# Patient Record
Sex: FEMALE | Race: WHITE | NOT HISPANIC OR LATINO | Employment: PART TIME | URBAN - METROPOLITAN AREA
[De-identification: names, ages, dates, MRNs, and addresses within clinical notes are randomized per-mention and may not be internally consistent; named-entity substitution may affect disease eponyms.]

---

## 2017-01-04 ENCOUNTER — ALLSCRIPTS OFFICE VISIT (OUTPATIENT)
Dept: OTHER | Facility: OTHER | Age: 62
End: 2017-01-04

## 2017-03-07 ENCOUNTER — ALLSCRIPTS OFFICE VISIT (OUTPATIENT)
Dept: OTHER | Facility: OTHER | Age: 62
End: 2017-03-07

## 2017-03-17 ENCOUNTER — ALLSCRIPTS OFFICE VISIT (OUTPATIENT)
Dept: OTHER | Facility: OTHER | Age: 62
End: 2017-03-17

## 2017-05-09 ENCOUNTER — LAB REQUISITION (OUTPATIENT)
Dept: LAB | Facility: HOSPITAL | Age: 62
End: 2017-05-09
Payer: COMMERCIAL

## 2017-05-09 ENCOUNTER — ALLSCRIPTS OFFICE VISIT (OUTPATIENT)
Dept: OTHER | Facility: OTHER | Age: 62
End: 2017-05-09

## 2017-05-09 DIAGNOSIS — Z12.72 ENCOUNTER FOR SCREENING FOR MALIGNANT NEOPLASM OF VAGINA: ICD-10-CM

## 2017-05-09 PROCEDURE — G0145 SCR C/V CYTO,THINLAYER,RESCR: HCPCS | Performed by: OBSTETRICS & GYNECOLOGY

## 2017-05-12 DIAGNOSIS — I82.409 ACUTE EMBOLISM AND THROMBOSIS OF DEEP VEIN OF LOWER EXTREMITY (HCC): ICD-10-CM

## 2017-05-15 ENCOUNTER — HOSPITAL ENCOUNTER (OUTPATIENT)
Dept: ULTRASOUND IMAGING | Facility: MEDICAL CENTER | Age: 62
Discharge: HOME | End: 2017-05-15
Payer: COMMERCIAL

## 2017-05-15 DIAGNOSIS — I82.409 ACUTE EMBOLISM AND THROMBOSIS OF DEEP VEIN OF LOWER EXTREMITY (HCC): ICD-10-CM

## 2017-05-15 PROCEDURE — 93970 EXTREMITY STUDY: CPT

## 2017-05-17 LAB
LAB AP GYN PRIMARY INTERPRETATION: NORMAL
Lab: NORMAL

## 2017-05-19 ENCOUNTER — ALLSCRIPTS OFFICE VISIT (OUTPATIENT)
Dept: OTHER | Facility: OTHER | Age: 62
End: 2017-05-19

## 2017-06-15 ENCOUNTER — HOSPITAL ENCOUNTER (OUTPATIENT)
Dept: MAMMOGRAPHY | Facility: CLINIC | Age: 62
Discharge: HOME | End: 2017-06-15
Payer: COMMERCIAL

## 2017-06-15 DIAGNOSIS — Z12.72 ENCOUNTER FOR SCREENING FOR MALIGNANT NEOPLASM OF VAGINA: ICD-10-CM

## 2017-06-15 PROCEDURE — G0202 SCR MAMMO BI INCL CAD: HCPCS

## 2018-01-10 NOTE — MISCELLANEOUS
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  #2 post surgery left groin hematoma with secondary cellulitis-status post admission to the hospital with use of IV and then oral antibiotics  Was on vancomycin in the hospital and now on clindamycin  Had a second opinion from surgery Hospital from nothing should be done  She is completing oral antibiotics  #3 DVT-below the knee-she will increase her aspirin to 325 mg daily  12 repeat Doppler over the next week-also on omeprazole for simultaneous GI protection  #4 anemia-felt to be from acute illness-hemoglobin prior to discharge approximately 10-for follow-up CBC in the future  #4 diarrhea-felt to be from clindamycin-it persists will need evaluation for C  difficile  She is a ready on probiotic  #5 fibromyalgia-stable  Aggravated by insomnia  Has known intermittent arthralgias from DJD as well as fibromyalgia  Rheumatoid factor, Lyme titer, thyroid blood work, anti-SSA, anti-SSB, protein electrophoresis, chest x-ray and sedimentation rate all normal  Recently started on amitriptyline with some improvement  #6 hypertension-much improved on lisinopril 10 mg daily  #7 degenerative arthritis of the knees-has had prior injections as well as viscoelastic injections  Also does knee strengthening exercises  May need additional surgery in the future  #8 low back pain-aggravated by her gait  Epidural no benefit  Seen pain management and they were talking about more aggressive intervention   At this point she was see as she does with treating her lower legs observing the back  #9 trochanteric bursitis-had prior injections  #10 osteopenia-last bone density shows a femur -1 7  Subsequent study due by December 2016  Recent vitamin D level normal  #11 hyperglycemia-blood sugars normalized with weight loss  Needs hemoglobin A1c in the future  #12 general care-colonoscopy done October 2010 stable  GI physician recommended repeat colonoscopy in 7 years which would be 2017  #13 allergies-uses over-the-counter Zyrtec plus Patanol  #14 low vitamin D level-on supplement  #15 right leg pain-had prior knee surgery  Had tear of medial meniscus as well as underlying DJD-also felt to have trochanteric bursitis as well as iliotibial band syndrome  #16 abnormal mammogram pressure follow-up mammogram that was abnormal but subsequent repeat mammograms all stable with next recommended in May 2017    All other problems as per note of May 2001, June 2004, April 2002, 1991    MEDICAL REGIMEN: Aleve one by mouth twice a day when necessary, aspirin 325 mg a day, Zyrtec 10 mg daily as needed, lisinopril 10 mg daily, eyedrops, amitriptyline 10 MGs-half tablet to whole tablet before bed, omeprazole 40 mg daily,Florastor 250 mg daily    Addendum-Doppler study done in November the 30th shows resolution of clot in the soleal vein  Clot in the posterior tibial and peroneal vein unchanged  We will continue whole aspirin  Needs repeat study in 2 weeks    Addendum-patient called the office in December the eighth that she was having a lot of nausea  She was on a probiotic omeprazole 40 mg daily  She is told increase omeprazole to 40 mg twice a day for one week and then return once daily  She will call a week regarding status addendum-repeat Doppler done in December the 14th shows clot in the left unchanged for her to subacute versus chronic and they also read chronic nonocclusive clot in the right and a short segment of one of the  Posterior tibial veins of the distal calf   Repeat Doppler in 2-3 weeks which will be done the week of December 25th and or January first    Addendum-received a call from hematology later worried about potential propagation of clot and decided to anticoagulate the patient  This was initiated by hematology  Plan  Change aspirin to 325 mg daily  Omeprazole dose to be 40 mg daily  Repeat Doppler of the leg over the next week to reassess DVT  Avoid excess caffeine or extremely frightened agree seafood  Call if persistent diarrhea  Call for any symptoms of recurrent cellulitis     History of Present Illness  TCM Communication [facility]: The patient is being contacted for follow-up after hospitalization  She was hospitalized at [place]  The date of admission: 11/18/2016, date of discharge: 11/21/2016  Diagnosis: Cellulitis of left groin  She was discharged to home  She scheduled a follow up appointment  The patient is currently experiencing symptoms  Still has left side Groin pain Counseling was provided to the patient  Communication performed and completed by Juliano Kenney   HPI: Seen today after recent hospitalizations  As noted she underwent cardiac ablation procedure November  She had complicating left groin hematoma requiring treatment  She was ambulated recently with increasing erythema and pain at the site of the left groin surgical site  She had changes of cellulitis around her left groin incision with serous drainage from her RANDY drain  She was admitted to the surgery service with left groin cellulitis  She was started on antibody therapy  Fluid cultures from her operative intervention during prior hospitalizations were reviewed and remain negative  She had a second opinion felt course of treatment was correct  She had evidence of DVT the left leg which has developed  It was elected because this is below the knee not to treat   Labs done in the hospital showed creatinine of 0 59, hemoglobin 9 6, benign urinalysis, CAT scan of the femur with contrast showed 3 8 cm collection of fluid and air bubbles in the groin-anteromedial thigh  There is another fluid collection more inferomedially and the thigh did not appear infected  Doppler the legs showed acute DVT in the posterior tibial on the left and peroneal vein as well as a soleal vein at mid calf there is no evidence of disease above the knee  She was sent home with aspirin and not anticoagulated formal anticoagulants  She is not in any abrupt onset of episodes of shortness of breath  She has loose stool going 3+ times per day since she is somewhat clindamycin  She is due to discontinue clindamycin tomorrow  She has not had any bloody stool  She denies any fever or chills and she is home  She has some GI upset with some intermittent nausea but denies vomiting  She has not had any recurrence of her palpitations since her ablation  This patient denies any systemic symptoms  Specifically there has been no evidence of fever, night sweats, significant weight loss or significant decrease in appetite  This patient wanted to know their preferred analgesic agent  Because of their various comorbidities I recommended that this be acetaminophen  This patient has no history of chronic liver disease that would put them at greater risk for use of acetaminophen  This patient may use up to 500-650 mg of acetaminophen at a time and no more than 3 g a day total  Nonsteroidal anti-inflammatory agents have the potential to exacerbate hypertension, hypercoagulability, chronic renal failure, congestive heart failure, and various allergic tendencies  Because of her comorbidities as well as her SVT we want her to use acetaminophen rather than nonsteroidals as her analgesic agent      Review of Systems  Complete-Female:   Constitutional: No fever, no chills, feels well, no tiredness, no recent weight gain or weight loss     Eyes: No complaints of eye pain, no red eyes, no eyesight problems, no discharge, no dry eyes, no itching of eyes    ENT: no complaints of earache, no loss of hearing, no nose bleeds, no nasal discharge, no sore throat, no hoarseness  Cardiovascular: palpitations, but the heart rate was not slow, no chest pain, no intermittent leg claudication, the heart rate was not fast and no lower extremity edema  Respiratory: No complaints of shortness of breath, no wheezing, no cough, no SOB on exertion, no orthopnea, no PND  Gastrointestinal: diarrhea, but no abdominal pain, no nausea, no vomiting, no constipation and no blood in stools  Genitourinary: No complaints of dysuria, no incontinence, no pelvic pain, no dysmenorrhea, no vaginal discharge or bleeding  Musculoskeletal: arthralgias, limb pain, myalgias and Left knee pain-left leg pain with associated wound from recent surgery-cellulitis, but no joint swelling, no joint stiffness and no limb swelling  Integumentary: No complaints of skin rash or lesions, no itching, no skin wounds, no breast pain or lump  Neurological: No complaints of headache, no confusion, no convulsions, no numbness, no dizziness or fainting, no tingling, no limb weakness, no difficulty walking  Psychiatric: Not suicidal, no sleep disturbance, no anxiety or depression, no change in personality, no emotional problems  Endocrine: No complaints of proptosis, no hot flashes, no muscle weakness, no deepening of the voice, no feelings of weakness  Hematologic/Lymphatic: No complaints of swollen glands, no swollen glands in the neck, does not bleed easily, does not bruise easily  Active Problems   1  Abnormal finding on mammography (793 80) (R92 8)  2  Acute pain of left hip (719 45) (M25 552)  3  Arthritis (716 90) (M19 90)  4  Back pain, lumbosacral (724 2,724 6) (M54 5,M54 89)  5  Bilateral hip bursitis (726 5) (M70 71,M70 72)  6  Bursitis of left hip (726 5) (M70 72)  7  Chest pain (786 50) (R07 9)  8  Chronic left sacroiliac pain (724 6,338 29) (M53 3,G89 29)  9   Chronic low back pain (724 2,338 29) (M54 5,G89 29)  10  Dyspepsia (536 8) (K30)  11  Elevated AST (SGOT) (790 4) (R74 0)  12  Encounter for routine gynecological examination (V72 31) (Z01 419)  13  Female bladder prolapse (618 01) (N81 10)  14  Hyperglycemia (790 29) (R73 9)  15  Hyperlipidemia (272 4) (E78 5)  16  Hypertension (401 9) (I10)  17  Injury of common femoral artery, left, initial encounter (904 0) (S75 002A)  18  Limb pain (729 5) (M79 609)  19  Lumbar spondylosis (721 3) (M47 816)  20  Osteoarthritis of left knee (715 96) (M17 9)  21  Osteopenia (733 90) (M85 80)  22  Palpitations (785 1) (R00 2)  23  Primary osteoarthritis of left hip (715 15) (M16 12)  24  SVT (supraventricular tachycardia) (427 89) (I47 1)  25  Vaginal Pap smear (V76 47) (Z12 72)  26  Visit for screening mammogram (V76 12) (Z12 31)  27  Vitamin D deficiency (268 9) (E55 9)    Past Medical History   1  History of Fibromyalgia (729 1) (M79 7)  2  History of  3 (V22 2) (Z33 1)  3  History of hyperlipidemia (V12 29) (Z86 39)  4  History of polymyalgia rheumatica (V13 59) (Z87 39)  5  History of Long term use of drug (V58 69) (Z79 899)  6  History of Osteoarthritis (V13 4)  7  History of Paroxysmal tachycardia (427 2) (I47 9)  8  History of Plantar fasciitis (728 71) (M72 2)  9  History of Visit for screening mammogram (V76 12) (Z12 31)    Surgical History   1  History of Hernia Repair  2  History Of Prior Surgery  3  History of Laparoscopy (Diagnostic)  4  History of Total Abdominal Hysterectomy With Removal Of Both Ovaries  Surgical History Reviewed: The surgical history was reviewed and updated today  Family History  Mother   1  Family history of Breast cancer  Father   2  Family history of Heart disease  Sister   3  Family history of Breast cancer  Paternal Grandmother   4  Family history of Breast cancer  5  Family history of Diabetes mellitus  Maternal Aunt   6  Family history of Breast cancer  Paternal Aunt   9   Family history of Diabetes mellitus  Family History   8  Family history of Atherosclerosis (V17 49)  9  Family history of Hyperlipidemia  10  Family history of Hypertension (V17 49)    Social History    · Being A Social Drinker   · Denied: History of Drug Use   · Denied: History of Home environment domestic violence   · Never A Smoker  Social History Reviewed: The social history was reviewed and updated today  The social history was reviewed and is unchanged  Current Meds  1  Calcium TABS; Therapy: (Recorded:37Msn3612) to Recorded  2  Glucosamine Chondroitin Complx TABS; Therapy: (Recorded:05May2015) to Recorded  3  Lisinopril 10 MG Oral Tablet; TAKE 1 TABLET DAILY; Last Rx:17Mar2014 Ordered  4  Metoprolol Tartrate 25 MG Oral Tablet; take 1 tablet prn SVT/palpiations; Therapy: 51WBT8558 to (Evaluate:18Pot9961)  Requested for: 28Dcr5179; Last   Rx:12Vqs7260 Ordered  5  Multiple Vitamins Oral Tablet Recorded  6  Sulfacetamide Sodium 10 % Ophthalmic Solution; instill 2 drops to affected eye four   times a day for 5-7 days; Therapy: 81TYW6298 to (Last Rx:48Jju6816)  Requested for: 50Vjl5634 Ordered  7  Vitamin D3 2000 UNIT Oral Tablet; Therapy: (Recorded:67Dhp9720) to Recorded  Medication List Reviewed: The medication list was reviewed and updated today  Allergies   1  Penicillins  2  Percocet TABS    Vitals  Signs   Recorded: 26Nov2016 08:29AM   Heart Rate: 72  Respiration: 14  Systolic: 749, RUE, Sitting  Diastolic: 74, RUE, Sitting    Physical Exam    Constitutional   General appearance: No acute distress, well appearing and well nourished  Head and Face   Head and face: Normal     Palpation of the face and sinuses: No sinus tenderness  Eyes   Conjunctiva and lids: No swelling, erythema or discharge  Pupils and irises: Equal, round, reactive to light      Ears, Nose, Mouth, and Throat   External inspection of ears and nose: Normal     Otoscopic examination: Tympanic membranes translucent with normal light reflex  Canals patent without erythema  Hearing: Normal     Nasal mucosa, septum, and turbinates: Normal without edema or erythema  Lips, teeth, and gums: Normal, good dentition  Oropharynx: Normal with no erythema, edema, exudate or lesions  Neck   Neck: Supple, symmetric, trachea midline, no masses  Thyroid: Normal, no thyromegaly  Pulmonary   Respiratory effort: No increased work of breathing or signs of respiratory distress  Percussion of chest: Normal     Palpation of chest: Normal     Auscultation of lungs: Clear to auscultation  Cardiovascular   Palpation of heart: Normal PMI, no thrills  Auscultation of heart: Normal rate and rhythm, normal S1 and S2, no murmurs  Carotid pulses: 2+ bilaterally  Abdominal aorta: Normal     Femoral pulses: 2+ bilaterally  Pedal pulses: 2+ bilaterally  Peripheral vascular exam: Normal     Examination of extremities for edema and/or varicosities: Abnormal   Peripheral venous disease with trace edema the left leg  Chest   Breasts: Normal, no dimpling or skin changes appreciated  Palpation of breasts and axillae: Normal, no masses palpated  Chest: Normal     Abdomen   Abdomen: Non-tender, no masses  Liver and spleen: No hepatomegaly or splenomegaly  Examination for hernias: No hernia appreciated  Anus, perineum, and rectum: Normal sphincter tone, no masses, no prolapse  Stool sample for occult blood: Negative  Lymphatic   Palpation of lymph nodes in neck: No lymphadenopathy  Palpation of lymph nodes in axillae: No lymphadenopathy  Palpation of lymph nodes in groin: No lymphadenopathy  Palpation of lymph nodes in other areas: No lymphadenopathy  Musculoskeletal   Gait and station: Normal     Digits and nails: Normal without clubbing or cyanosis      Joints, bones, and muscles: Normal     Range of motion: Normal     Stability: Normal     Muscle strength/tone: Normal     Skin   Skin and subcutaneous tissue: Abnormal   Evidence of recent left groin surgery with healing wound with sutures-ecchymosis of the left upper leg-slight swelling of the left leg  Palpation of skin and subcutaneous tissue: Normal turgor  Neurologic   Cranial nerves: Cranial nerves II-XII intact  Reflexes: 2+ and symmetric  Sensation: No sensory loss  Psychiatric   Judgment and insight: Normal     Orientation to person, place, and time: Normal     Recent and remote memory: Intact  Mood and affect: Normal        Health Management  History of Screening for colorectal cancer   COLONOSCOPY; every 10 years; Last 01Oct2010; Next Due: 54OCG2698; Active    Future Appointments    Date/Time Provider Specialty Site   12/01/2016 09:00 AM General Surgery, Schedule  [facility]   12/14/2016 02:20 PM LEIGH Arredondo   Cardiology [facility]     Signatures   Electronically signed by : LEIGH Steiner ; Nov 26 2016  9:45AM EST                       (Author)    Electronically signed by : LEIGH Steiner ; Nov 26 2016  1:13PM EST                       (Author)    Electronically signed by : LEIGH Steiner ; Nov 26 2016  1:17PM EST                       (Author)    Electronically signed by : LEIGH Steiner ; Dec  1 2016 12:59PM EST                       (Author)    Electronically signed by : LEIGH Steiner ; Dec  6 2016  9:10PM EST                       (Author)    Electronically signed by : LEIGH Steiner ; Dec 11 2016  6:05PM EST                       (Author)    Electronically signed by : LEIGH Steiner ; Dec 18 2016 10:18AM EST                       (Author)    Electronically signed by : LEIGH Steiner ; Dec 22 2016 11:51AM EST                       (Author)

## 2018-01-10 NOTE — RESULT NOTES
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, if at [address] she can come Monday 08 22 2016 at any time  Leann Peals Leann Peals Leann Peals Your sched is full for that day, you have two RFA's; the last in the morning is 11:00am  Leann Peals Leann Peals Let me know  Leann Peals Leann Peals Leann Peals Thanks  Ernie Barron - 10 Aug 2016 2:14 PM     TASK REPLIED TO: Previously Assigned To Ernie Barron                      ok to put on at 11:30 on 8/22 thank you   Mariza Tsang - 10 Aug 2016 3:12 PM     TASK EDITED  Called pt to sched MBB#[id number]; ok'd je for 08 22 2016  @11:15am for 11:30am; verbal inst given and aware of needing a , and aware with meds as well   pt understood          Signatures   Electronically signed by : Meaghan Chandler, ; Aug 10 2016  3:13PM EST                       (Author)

## 2018-01-12 VITALS
SYSTOLIC BLOOD PRESSURE: 118 MMHG | DIASTOLIC BLOOD PRESSURE: 76 MMHG | HEART RATE: 80 BPM | TEMPERATURE: 97.7 F | HEIGHT: 62 IN | OXYGEN SATURATION: 97 % | WEIGHT: 174 LBS | BODY MASS INDEX: 32.02 KG/M2 | RESPIRATION RATE: 14 BRPM

## 2018-01-12 VITALS
HEIGHT: 62 IN | BODY MASS INDEX: 32.09 KG/M2 | DIASTOLIC BLOOD PRESSURE: 78 MMHG | WEIGHT: 174.38 LBS | SYSTOLIC BLOOD PRESSURE: 138 MMHG

## 2018-01-13 VITALS
DIASTOLIC BLOOD PRESSURE: 70 MMHG | BODY MASS INDEX: 32.39 KG/M2 | HEIGHT: 62 IN | HEART RATE: 79 BPM | SYSTOLIC BLOOD PRESSURE: 116 MMHG | WEIGHT: 176 LBS

## 2018-01-14 VITALS
RESPIRATION RATE: 14 BRPM | DIASTOLIC BLOOD PRESSURE: 80 MMHG | SYSTOLIC BLOOD PRESSURE: 120 MMHG | HEART RATE: 74 BPM | WEIGHT: 172.38 LBS | HEIGHT: 62 IN | BODY MASS INDEX: 31.72 KG/M2

## 2018-01-14 VITALS
OXYGEN SATURATION: 97 % | DIASTOLIC BLOOD PRESSURE: 74 MMHG | HEART RATE: 74 BPM | HEIGHT: 62 IN | RESPIRATION RATE: 14 BRPM | BODY MASS INDEX: 32.57 KG/M2 | TEMPERATURE: 98 F | WEIGHT: 177 LBS | SYSTOLIC BLOOD PRESSURE: 118 MMHG

## 2018-01-15 NOTE — RESULT NOTES
Message   Recorded as Task   Date: 09/09/2016 03:55 PM, Created By: Sunshine Mahoney   Task Name: Call Back   Assigned To: Sunshine Mahoney   Regarding Patient: John Cohen, Status: Active   Comment:    Sunshine Mahoney - 09 Sep 2016 3:55 PM     TASK CREATED  Pt called to cx'l proc on 09 12 2016 stated she feeling a lot better and didn't feel the need for the proc  Marylu Toro I returned the call to aware her I did rec'd the message and she was greatful and also appreciated with our care for her          Signatures   Electronically signed by : Ophelia Paris, ; Sep  9 2016  3:56PM EST                       (Author)

## 2018-01-16 NOTE — MISCELLANEOUS
History of Present Illness  TCM Communication [facility]: The patient is being contacted for follow-up after hospitalization and [facility]  She was hospitalized at and [facility]  The date of admission: 11/12/2016, Patient is still in the hospital  She was discharged Patient is still in the hospital    She did not schedule a follow up appointment  The patient is currently asymptomatic  Counseling was provided to  Patient is still in the hospital, LINDA generated when it shouldn't have  Communication performed and completed by Vitaly Rodriguez      Active Problems     1  Abnormal finding on mammography (793 80) (R92 8)   2  Acute pain of left hip (719 45) (M25 552)   3  Arthritis (716 90) (M19 90)   4  Back pain, lumbosacral (724 2,724 6) (M54 5,M54 89)   5  Bilateral hip bursitis (726 5) (M70 71,M70 72)   6  Bursitis of left hip (726 5) (M70 72)   7  Chest pain (786 50) (R07 9)   8  Chronic left sacroiliac pain (724 6,338 29) (M53 3,G89 29)   9  Chronic low back pain (724 2,338 29) (M54 5,G89 29)   10  Dyspepsia (536 8) (K30)   11  Elevated AST (SGOT) (790 4) (R74 0)   12  Encounter for routine gynecological examination (V72 31) (Z01 419)   13  Female bladder prolapse (618 01) (N81 10)   14  Hyperglycemia (790 29) (R73 9)   15  Hyperlipidemia (272 4) (E78 5)   16  Injury of common femoral artery, left, initial encounter (904 0) (S75 002A)   17  Limb pain (729 5) (M79 609)   18  Lumbar spondylosis (721 3) (M47 816)   19  Osteoarthritis of left knee (715 96) (M17 9)   20  Osteopenia (733 90) (M85 80)   21  Palpitations (785 1) (R00 2)   22  Primary osteoarthritis of left hip (715 15) (M16 12)   23  Vaginal Pap smear (V76 47) (Z12 72)   24  Visit for screening mammogram (V76 12) (Z12 31)   25  Vitamin D deficiency (268 9) (E55 9)    Hypertension (401 9) (I10)       SVT (supraventricular tachycardia) (427 89) (I47 1)          Past Medical History    1  History of Fibromyalgia (729 1) (M79 7)   2   History of  3 (V22 2) (Z33 1)   3  History of hyperlipidemia (V12 29) (Z86 39)   4  History of polymyalgia rheumatica (V13 59) (Z87 39)   5  History of Long term use of drug (V58 69) (Z79 899)   6  History of Osteoarthritis (V13 4)   7  History of Paroxysmal tachycardia (427 2) (I47 9)   8  History of Plantar fasciitis (728 71) (M72 2)   9  History of Visit for screening mammogram (V76 12) (Z12 31)    Surgical History    1  History of Hernia Repair   2  History Of Prior Surgery   3  History of Laparoscopy (Diagnostic)   4  History of Total Abdominal Hysterectomy With Removal Of Both Ovaries    Family History  Mother    1  Family history of Breast cancer  Father    2  Family history of Heart disease  Sister    3  Family history of Breast cancer  Paternal Grandmother    4  Family history of Breast cancer   5  Family history of Diabetes mellitus  Maternal Aunt    6  Family history of Breast cancer  Paternal Aunt    9  Family history of Diabetes mellitus  Family History    8  Family history of Atherosclerosis (V17 49)   9  Family history of Hyperlipidemia   10  Family history of Hypertension (V17 49)    Social History    · Being A Social Drinker   · Denied: History of Drug Use   · Denied: History of Home environment domestic violence   · Never A Smoker    Current Meds   1  Calcium TABS; Therapy: (Recorded:20Zmh0132) to Recorded   2  Glucosamine Chondroitin Complx TABS; Therapy: (Recorded:83Vsn3714) to Recorded   3  Lisinopril 10 MG Oral Tablet; TAKE 1 TABLET DAILY; Last Rx:2014 Ordered   4  Metoprolol Tartrate 25 MG Oral Tablet; take 1 tablet prn SVT/palpiations; Therapy: 51WLE2337 to (Evaluate:72Cyz6069)  Requested for: 36Vxd0104; Last   Rx:87Dgy3279 Ordered   5  Multiple Vitamins Oral Tablet Recorded   6  Sulfacetamide Sodium 10 % Ophthalmic Solution; instill 2 drops to affected eye four   times a day for 5-7 days; Therapy: 50XKH5766 to (Last Rx:63Ajw1566)  Requested for: 76Fzj3732 Ordered   7   Vitamin D3 2000 UNIT Oral Tablet; Therapy: (Recorded:10Nwd5520) to Recorded    Allergies    1  Penicillins   2  Percocet TABS    Health Management  History of Screening for colorectal cancer   COLONOSCOPY; every 10 years; Last 01Oct2010; Next Due: 27GLH0012; Active    Future Appointments    Date/Time Provider Specialty Site   12/01/2016 09:00 AM General Surgery, Schedule  [facility]   12/12/2016 08:30 AM LEIGH Jones  Hematology [facility]   01/09/2017 06:00 AM LEIGH Hatfield  Internal Medicine Ken Galicia MD   12/14/2016 02:20 PM LEIGH Toussaint   Cardiology [facility]     Signatures   Electronically signed by : Leni Pacheco OM; Nov 17 2016 12:25PM EST                       (Author)    Electronically signed by : LEIGH Landry ; Dec  7 2016  3:00PM EST                       (Author)

## 2018-01-16 NOTE — MISCELLANEOUS
Message  [facility] called  Pt's groin incision looks "angry" red; is warm and tender to the touch  Advised pt to go to ER  mb      Active Problems     1  Abnormal finding on mammography (793 80) (R92 8)   2  Acute pain of left hip (719 45) (M25 552)   3  Arthritis (716 90) (M19 90)   4  Back pain, lumbosacral (724 2,724 6) (M54 5,M54 89)   5  Bilateral hip bursitis (726 5) (M70 71,M70 72)   6  Bursitis of left hip (726 5) (M70 72)   7  Chest pain (786 50) (R07 9)   8  Chronic left sacroiliac pain (724 6,338 29) (M53 3,G89 29)   9  Chronic low back pain (724 2,338 29) (M54 5,G89 29)   10  Dyspepsia (536 8) (K30)   11  Elevated AST (SGOT) (790 4) (R74 0)   12  Encounter for routine gynecological examination (V72 31) (Z01 419)   13  Female bladder prolapse (618 01) (N81 10)   14  Hyperglycemia (790 29) (R73 9)   15  Hyperlipidemia (272 4) (E78 5)   16  Injury of common femoral artery, left, initial encounter (904 0) (S75 002A)   17  Limb pain (729 5) (M79 609)   18  Lumbar spondylosis (721 3) (M47 816)   19  Osteoarthritis of left knee (715 96) (M17 9)   20  Osteopenia (733 90) (M85 80)   21  Palpitations (785 1) (R00 2)   22  Primary osteoarthritis of left hip (715 15) (M16 12)   23  Vaginal Pap smear (V76 47) (Z12 72)   24  Visit for screening mammogram (V76 12) (Z12 31)   25  Vitamin D deficiency (268 9) (E55 9)    Hypertension (401 9) (I10)       SVT (supraventricular tachycardia) (427 89) (I47 1)          Current Meds   1  Calcium TABS; Therapy: (Recorded:02Ahp5960) to Recorded   2  Glucosamine Chondroitin Complx TABS; Therapy: (Recorded:74Pqy9804) to Recorded   3  Lisinopril 10 MG Oral Tablet; TAKE 1 TABLET DAILY; Last Rx:17Mar2014 Ordered   4  Metoprolol Tartrate 25 MG Oral Tablet; take 1 tablet prn SVT/palpiations; Therapy: 34DXC2358 to (Evaluate:96Elt3573)  Requested for: 16Sep2016; Last   Rx:78Mny8182 Ordered   5  Multiple Vitamins Oral Tablet Recorded   6   Sulfacetamide Sodium 10 % Ophthalmic Solution; instill 2 drops to affected eye four   times a day for 5-7 days; Therapy: 15OWL8457 to (Last Rx:80Utz7899)  Requested for: 50Oij5120 Ordered   7  Vitamin D3 2000 UNIT Oral Tablet; Therapy: (Recorded:96Bmf6769) to Recorded    Allergies    1  Penicillins   2  Percocet TABS    Signatures   Electronically signed by :  LEIGH Bianchi ; Dec 14 2016  3:50PM EST                       (Review)

## 2018-06-13 ENCOUNTER — ANNUAL EXAM (OUTPATIENT)
Dept: OBGYN CLINIC | Facility: CLINIC | Age: 63
End: 2018-06-13
Payer: COMMERCIAL

## 2018-06-13 VITALS
DIASTOLIC BLOOD PRESSURE: 72 MMHG | SYSTOLIC BLOOD PRESSURE: 136 MMHG | BODY MASS INDEX: 32.32 KG/M2 | HEIGHT: 63 IN | WEIGHT: 182.4 LBS

## 2018-06-13 DIAGNOSIS — Z12.39 BREAST CANCER SCREENING: ICD-10-CM

## 2018-06-13 DIAGNOSIS — Z01.419 ENCOUNTER FOR GYNECOLOGICAL EXAMINATION: ICD-10-CM

## 2018-06-13 DIAGNOSIS — Z01.419 PAP SMEAR, AS PART OF ROUTINE GYNECOLOGICAL EXAMINATION: Primary | ICD-10-CM

## 2018-06-13 PROCEDURE — S0612 ANNUAL GYNECOLOGICAL EXAMINA: HCPCS | Performed by: OBSTETRICS & GYNECOLOGY

## 2018-06-13 PROCEDURE — G0145 SCR C/V CYTO,THINLAYER,RESCR: HCPCS | Performed by: OBSTETRICS & GYNECOLOGY

## 2018-06-13 RX ORDER — MELOXICAM 15 MG/1
TABLET ORAL
COMMUNITY
Start: 2018-05-06

## 2018-06-13 NOTE — PROGRESS NOTES
Assessment/Plan:    Assessment:  Annual gynecologic medical examination    Plan:  Normal gynecologic physical examination today    Self-breast examinations stressed to the patient    Mammogram ordered    Pap smear completed    We discussed the importance of good diet and regular exercise    We also reviewed the use of calcium and vitamin-D supplementation in her diet which she does regularly    We also reviewed the use of sun block when exposed to the [facility] for prolonged amounts of time    Patient will be seen in 1 year for her annual gynecologic medical examination    All questions were answered in detail for her at today's visit    Patient will call for any issues problems or concerns which arise during the interim            No problem-specific Assessment & Plan notes found for this encounter  Diagnoses and all orders for this visit:    Pap smear, as part of routine gynecological examination  -     Liquid-based pap, screening    Encounter for gynecological examination    Breast cancer screening  -     Mammo screening bilateral w cad; Future    Other orders  -     meloxicam (MOBIC) 15 mg tablet;         Subjective:      Patient ID: Jeniffer Galicia is a 61 y o  female  Patient is a 51-year-old female who presents today for her annual gynecologic medical examination            The following portions of the patient's history were reviewed and updated as appropriate: allergies, current medications, past family history, past medical history, past social history, past surgical history and problem list     Review of Systems   Constitutional: Negative  HENT: Negative  Eyes: Negative  Respiratory: Negative  Cardiovascular: Negative  Past history of atrial fibrillation successfully treated with ablation    Currently followed for blood pressure    Had groin infection following the catheterization           Gastrointestinal: Negative  Endocrine: Negative  Genitourinary: Negative  Musculoskeletal: Negative  Skin: Negative  Neurological: Negative  Psychiatric/Behavioral: Negative  Objective:      /72 (BP Location: Left arm, Patient Position: Sitting, Cuff Size: Standard)   Ht 5' 3" (1 6 m)   Wt 82 7 kg (182 lb 6 4 oz)   BMI 32 31 kg/m²          Physical Exam   Constitutional: She appears well-developed  HENT:   Head: Normocephalic  Eyes: Pupils are equal, round, and reactive to light  Neck: Normal range of motion  Neck supple  Cardiovascular: Normal rate and regular rhythm  Pulmonary/Chest: Effort normal and breath sounds normal  Right breast exhibits no inverted nipple, no mass, no nipple discharge, no skin change and no tenderness  Left breast exhibits no inverted nipple, no mass, no nipple discharge, no skin change and no tenderness  Breasts are symmetrical    Abdominal: Soft  Normal appearance and bowel sounds are normal    Genitourinary: Rectum normal, vagina normal and uterus normal  Rectal exam shows guaiac negative stool  Pelvic exam was performed with patient supine  Right adnexum displays no mass, no tenderness and no fullness  Left adnexum displays no mass, no tenderness and no fullness  No vaginal discharge found  Lymphadenopathy:     She has no cervical adenopathy  She has no axillary adenopathy  Right: No inguinal and no supraclavicular adenopathy present  Left: No inguinal and no supraclavicular adenopathy present  Neurological: She is alert  Skin: Skin is intact  No rash noted  Psychiatric: She has a normal mood and affect   Her speech is normal and behavior is normal  Judgment and thought content normal  Cognition and memory are normal

## 2018-06-18 LAB
LAB AP GYN PRIMARY INTERPRETATION: NORMAL
Lab: NORMAL

## 2018-07-26 ENCOUNTER — HOSPITAL ENCOUNTER (OUTPATIENT)
Dept: MAMMOGRAPHY | Facility: CLINIC | Age: 63
Discharge: HOME | End: 2018-07-26
Payer: COMMERCIAL

## 2018-07-26 DIAGNOSIS — Z12.39 BREAST CANCER SCREENING: ICD-10-CM

## 2018-07-26 PROCEDURE — 77067 SCR MAMMO BI INCL CAD: CPT

## 2018-08-27 ENCOUNTER — APPOINTMENT (OUTPATIENT)
Dept: LAB | Facility: MEDICAL CENTER | Age: 63
End: 2018-08-27
Payer: COMMERCIAL

## 2018-08-27 ENCOUNTER — TRANSCRIBE ORDERS (OUTPATIENT)
Dept: ADMINISTRATIVE | Facility: HOSPITAL | Age: 63
End: 2018-08-27

## 2018-08-27 DIAGNOSIS — M79.10 MYALGIA: ICD-10-CM

## 2018-08-27 DIAGNOSIS — M79.10 MYALGIA: Primary | ICD-10-CM

## 2018-08-27 LAB
25(OH)D3 SERPL-MCNC: 43 NG/ML (ref 30–100)
ALBUMIN SERPL BCP-MCNC: 3.7 G/DL (ref 3.5–5)
ALP SERPL-CCNC: 102 U/L (ref 46–116)
ALT SERPL W P-5'-P-CCNC: 59 U/L (ref 12–78)
ANION GAP SERPL CALCULATED.3IONS-SCNC: 6 MMOL/L (ref 4–13)
AST SERPL W P-5'-P-CCNC: 30 U/L (ref 5–45)
BASOPHILS # BLD AUTO: 0.05 THOUSANDS/ΜL (ref 0–0.1)
BASOPHILS NFR BLD AUTO: 1 % (ref 0–1)
BILIRUB SERPL-MCNC: 0.39 MG/DL (ref 0.2–1)
BUN SERPL-MCNC: 20 MG/DL (ref 5–25)
CALCIUM SERPL-MCNC: 9.5 MG/DL (ref 8.3–10.1)
CHLORIDE SERPL-SCNC: 103 MMOL/L (ref 100–108)
CO2 SERPL-SCNC: 30 MMOL/L (ref 21–32)
CREAT SERPL-MCNC: 0.84 MG/DL (ref 0.6–1.3)
EOSINOPHIL # BLD AUTO: 0.27 THOUSAND/ΜL (ref 0–0.61)
EOSINOPHIL NFR BLD AUTO: 3 % (ref 0–6)
ERYTHROCYTE [DISTWIDTH] IN BLOOD BY AUTOMATED COUNT: 12.9 % (ref 11.6–15.1)
ERYTHROCYTE [SEDIMENTATION RATE] IN BLOOD: 10 MM/HOUR (ref 0–20)
GFR SERPL CREATININE-BSD FRML MDRD: 74 ML/MIN/1.73SQ M
GLUCOSE SERPL-MCNC: 121 MG/DL (ref 65–140)
HCT VFR BLD AUTO: 43.1 % (ref 34.8–46.1)
HGB BLD-MCNC: 14.5 G/DL (ref 11.5–15.4)
IMM GRANULOCYTES # BLD AUTO: 0.03 THOUSAND/UL (ref 0–0.2)
IMM GRANULOCYTES NFR BLD AUTO: 0 % (ref 0–2)
LYMPHOCYTES # BLD AUTO: 2.29 THOUSANDS/ΜL (ref 0.6–4.47)
LYMPHOCYTES NFR BLD AUTO: 25 % (ref 14–44)
MCH RBC QN AUTO: 29.6 PG (ref 26.8–34.3)
MCHC RBC AUTO-ENTMCNC: 33.6 G/DL (ref 31.4–37.4)
MCV RBC AUTO: 88 FL (ref 82–98)
MONOCYTES # BLD AUTO: 0.67 THOUSAND/ΜL (ref 0.17–1.22)
MONOCYTES NFR BLD AUTO: 7 % (ref 4–12)
NEUTROPHILS # BLD AUTO: 5.94 THOUSANDS/ΜL (ref 1.85–7.62)
NEUTS SEG NFR BLD AUTO: 64 % (ref 43–75)
NRBC BLD AUTO-RTO: 0 /100 WBCS
PLATELET # BLD AUTO: 329 THOUSANDS/UL (ref 149–390)
PMV BLD AUTO: 10.9 FL (ref 8.9–12.7)
POTASSIUM SERPL-SCNC: 3.9 MMOL/L (ref 3.5–5.3)
PROT SERPL-MCNC: 7.3 G/DL (ref 6.4–8.2)
RBC # BLD AUTO: 4.9 MILLION/UL (ref 3.81–5.12)
SODIUM SERPL-SCNC: 139 MMOL/L (ref 136–145)
TSH SERPL DL<=0.05 MIU/L-ACNC: 2.64 UIU/ML (ref 0.36–3.74)
WBC # BLD AUTO: 9.25 THOUSAND/UL (ref 4.31–10.16)

## 2018-08-27 PROCEDURE — 36415 COLL VENOUS BLD VENIPUNCTURE: CPT

## 2018-08-27 PROCEDURE — 84443 ASSAY THYROID STIM HORMONE: CPT

## 2018-08-27 PROCEDURE — 85652 RBC SED RATE AUTOMATED: CPT

## 2018-08-27 PROCEDURE — 82306 VITAMIN D 25 HYDROXY: CPT

## 2018-08-27 PROCEDURE — 85025 COMPLETE CBC W/AUTO DIFF WBC: CPT

## 2018-08-27 PROCEDURE — 80053 COMPREHEN METABOLIC PANEL: CPT

## 2019-06-18 ENCOUNTER — ANNUAL EXAM (OUTPATIENT)
Dept: OBGYN CLINIC | Facility: CLINIC | Age: 64
End: 2019-06-18
Payer: COMMERCIAL

## 2019-06-18 VITALS
BODY MASS INDEX: 32.14 KG/M2 | HEIGHT: 63 IN | SYSTOLIC BLOOD PRESSURE: 118 MMHG | WEIGHT: 181.4 LBS | DIASTOLIC BLOOD PRESSURE: 70 MMHG

## 2019-06-18 DIAGNOSIS — Z01.419 ENCOUNTER FOR GYNECOLOGICAL EXAMINATION WITHOUT ABNORMAL FINDING: ICD-10-CM

## 2019-06-18 DIAGNOSIS — Z12.39 BREAST CANCER SCREENING: Primary | ICD-10-CM

## 2019-06-18 PROCEDURE — S0612 ANNUAL GYNECOLOGICAL EXAMINA: HCPCS | Performed by: OBSTETRICS & GYNECOLOGY

## 2019-08-01 ENCOUNTER — HOSPITAL ENCOUNTER (OUTPATIENT)
Dept: MAMMOGRAPHY | Facility: CLINIC | Age: 64
Discharge: HOME | End: 2019-08-01
Payer: COMMERCIAL

## 2019-08-01 VITALS — HEIGHT: 63 IN | WEIGHT: 181 LBS | BODY MASS INDEX: 32.07 KG/M2

## 2019-08-01 DIAGNOSIS — Z12.39 BREAST CANCER SCREENING: ICD-10-CM

## 2019-08-01 PROCEDURE — 77067 SCR MAMMO BI INCL CAD: CPT

## 2020-06-19 ENCOUNTER — ANNUAL EXAM (OUTPATIENT)
Dept: OBGYN CLINIC | Facility: CLINIC | Age: 65
End: 2020-06-19
Payer: COMMERCIAL

## 2020-06-19 VITALS
HEIGHT: 63 IN | DIASTOLIC BLOOD PRESSURE: 84 MMHG | BODY MASS INDEX: 33.66 KG/M2 | WEIGHT: 190 LBS | SYSTOLIC BLOOD PRESSURE: 138 MMHG | TEMPERATURE: 99.5 F

## 2020-06-19 DIAGNOSIS — Z01.419 ENCOUNTER FOR GYNECOLOGICAL EXAMINATION WITHOUT ABNORMAL FINDING: ICD-10-CM

## 2020-06-19 DIAGNOSIS — Z12.39 BREAST CANCER SCREENING: Primary | ICD-10-CM

## 2020-06-19 PROCEDURE — G0101 CA SCREEN;PELVIC/BREAST EXAM: HCPCS | Performed by: OBSTETRICS & GYNECOLOGY

## 2020-06-19 RX ORDER — FLUTICASONE PROPIONATE 50 MCG
SPRAY, SUSPENSION (ML) NASAL
COMMUNITY
Start: 2020-06-15

## 2020-06-19 RX ORDER — LISINOPRIL 20 MG/1
TABLET ORAL
COMMUNITY
Start: 2020-05-20

## 2020-06-19 RX ORDER — DULOXETIN HYDROCHLORIDE 30 MG/1
30 CAPSULE, DELAYED RELEASE ORAL DAILY
COMMUNITY
Start: 2020-05-18

## 2020-08-11 ENCOUNTER — HOSPITAL ENCOUNTER (OUTPATIENT)
Dept: BONE DENSITY | Facility: CLINIC | Age: 65
Discharge: HOME | End: 2020-08-11
Payer: COMMERCIAL

## 2020-08-11 VITALS — BODY MASS INDEX: 33.66 KG/M2 | WEIGHT: 190 LBS | HEIGHT: 63 IN

## 2020-08-11 DIAGNOSIS — Z12.31 SCREENING MAMMOGRAM, ENCOUNTER FOR: ICD-10-CM

## 2020-08-11 DIAGNOSIS — Z12.39 BREAST CANCER SCREENING: ICD-10-CM

## 2020-08-11 PROCEDURE — 77063 BREAST TOMOSYNTHESIS BI: CPT

## 2020-08-11 PROCEDURE — 77067 SCR MAMMO BI INCL CAD: CPT

## 2021-03-10 DIAGNOSIS — Z23 ENCOUNTER FOR IMMUNIZATION: ICD-10-CM

## 2021-06-14 ENCOUNTER — ANNUAL EXAM (OUTPATIENT)
Dept: OBGYN CLINIC | Facility: CLINIC | Age: 66
End: 2021-06-14
Payer: COMMERCIAL

## 2021-06-14 VITALS
HEIGHT: 63 IN | WEIGHT: 198 LBS | SYSTOLIC BLOOD PRESSURE: 130 MMHG | DIASTOLIC BLOOD PRESSURE: 78 MMHG | BODY MASS INDEX: 35.08 KG/M2

## 2021-06-14 DIAGNOSIS — Z01.419 ENCOUNTER FOR GYNECOLOGICAL EXAMINATION WITHOUT ABNORMAL FINDING: ICD-10-CM

## 2021-06-14 DIAGNOSIS — Z12.31 ENCOUNTER FOR SCREENING MAMMOGRAM FOR MALIGNANT NEOPLASM OF BREAST: Primary | ICD-10-CM

## 2021-06-14 PROCEDURE — G0101 CA SCREEN;PELVIC/BREAST EXAM: HCPCS | Performed by: OBSTETRICS & GYNECOLOGY

## 2021-06-14 NOTE — PROGRESS NOTES
Assessment/Plan:    No problem-specific Assessment & Plan notes found for this encounter  Diagnoses and all orders for this visit:    Encounter for screening mammogram for malignant neoplasm of breast  -     Mammo screening bilateral w 3d & cad; Future    Encounter for gynecological examination without abnormal finding          Normal gynecological physical examination  Self-breast examination stressed  Mammogram ordered  Discussed regular exercise, healthy diet, importance of vitamin D and calcium supplements  Discussed importance of sun block use during periods of prolonged sun exposure  Patient will be seen in 1 year for routine gynecologic and medical examination  Patient will call office for any problems, concerns, or issues which may arise during the interim  Subjective:      Patient ID: Iris Doss is a 77 y o  female  Patient is a 77year old postmenopausal female who presents to the office today for her annual gynecological and medical examination  She reports new onset hot flashes, night sweats, and facial flushing for the last 6 months  She has never experienced symptoms like these before  She denies any new medications  She reports that she tried to stay well-hydrated, drinking 36-48 oz of water and 1 glass of Gatorade daily  She denies any new mood changes but admits to "grumpiness" which she can attribute to the current state of affairs in the world  She denies insomnia or vaginal dryness, bleeding, or discharge  She endorses hip arthralgias and back pain due to arthritis for which she follows with an orthopedist   She has been treated with  Intra-articular steroid injections with some relief  Additionally, she denies any symptoms of fevers, chills, chest pain, SOB, palpitations, bowel or bladder dysfunction, abdominal or pelvic pain, breast pain or masses, or nipple discharge  She denies any symptoms of COVID-19 including cough, sore throat, or loss of taste or smell  She is fully vaccinated against COVID-19  She takes several vitamin supplements daily including a multivitamin, calcium and vitamin D3, and turmeric  She is followed medically for hypertension, which is well controlled, and anxiety for which she takes duloxetine to good effect  Her last colonoscopy was approximately 2 years ago and her last mammogram was about 1 year ago  The following portions of the patient's history were reviewed and updated as appropriate: allergies, current medications, past family history, past medical history, past social history, past surgical history and problem list     Review of Systems   Constitutional: Negative  Negative for appetite change, diaphoresis, fatigue, fever and unexpected weight change  HENT: Negative  Negative for sore throat  Eyes: Negative  Respiratory: Negative  Negative for cough and shortness of breath  Cardiovascular: Negative  Negative for chest pain and palpitations  Gastrointestinal: Negative  Negative for abdominal pain, blood in stool, constipation, diarrhea, nausea and vomiting  Endocrine: Negative  Negative for cold intolerance and heat intolerance  Genitourinary: Negative  Negative for dysuria, frequency, hematuria, pelvic pain, urgency, vaginal bleeding, vaginal discharge and vaginal pain  Musculoskeletal: Positive for arthralgias and back pain  Skin: Negative  Allergic/Immunologic: Negative  Neurological: Negative  Negative for dizziness and headaches  Hematological: Negative  Negative for adenopathy  Psychiatric/Behavioral: Negative  Negative for dysphoric mood  Objective:      /78 (BP Location: Left arm, Patient Position: Sitting, Cuff Size: Large)   Ht 5' 3" (1 6 m)   Wt 89 8 kg (198 lb)   BMI 35 07 kg/m²          Physical Exam  Exam conducted with a chaperone present  Constitutional:       General: She is not in acute distress  Appearance: Normal appearance  She is well-developed  She is obese  She is not diaphoretic  HENT:      Head: Normocephalic and atraumatic  Eyes:      Pupils: Pupils are equal, round, and reactive to light  Cardiovascular:      Rate and Rhythm: Normal rate and regular rhythm  Heart sounds: Normal heart sounds  No murmur heard  No friction rub  No gallop  Pulmonary:      Effort: Pulmonary effort is normal       Breath sounds: Normal breath sounds  Chest:      Breasts: Breasts are symmetrical          Right: No inverted nipple, mass, nipple discharge, skin change or tenderness  Left: No inverted nipple, mass, nipple discharge, skin change or tenderness  Comments:   Fibrocystic breast changes noted bilaterally  Abdominal:      General: Abdomen is flat  Bowel sounds are normal       Palpations: Abdomen is soft  Genitourinary:     General: Normal vulva  Exam position: Lithotomy position  Labia:         Right: No rash or lesion  Left: No rash or lesion  Vagina: Normal  No vaginal discharge, erythema, tenderness or bleeding  Cervix: No discharge or friability  Uterus: Not enlarged and not tender  Adnexa:         Right: No mass, tenderness or fullness  Left: No mass, tenderness or fullness  Rectum: Normal  Guaiac result negative  Comments:   Minimal atrophic vaginal changes  Good pelvic support confirmed  Musculoskeletal:         General: Normal range of motion  Cervical back: Normal range of motion and neck supple  Lymphadenopathy:      Cervical: No cervical adenopathy  Upper Body:      Right upper body: No supraclavicular adenopathy  Left upper body: No supraclavicular adenopathy  Skin:     General: Skin is warm and dry  Findings: No rash  Neurological:      Mental Status: She is alert and oriented to person, place, and time     Psychiatric:         Mood and Affect: Mood normal          Speech: Speech normal          Behavior: Behavior normal  Thought Content:  Thought content normal          Judgment: Judgment normal

## 2021-06-14 NOTE — PATIENT INSTRUCTIONS
Normal gynecological physical examination  Self-breast examination stressed  Mammogram ordered  Discussed regular exercise, healthy diet, importance of vitamin D and calcium supplements  Discussed importance of sun block use during periods of prolonged sun exposure  Patient will be seen in 1 year for routine gynecologic and medical examination  Patient will call office for any problems, concerns, or issues which may arise during the interim  Recommended [place] for natural remedies to aid in relief of vasomotor symptoms of menopause, particularly oil of primrose and black cohosh

## 2021-08-12 ENCOUNTER — HOSPITAL ENCOUNTER (OUTPATIENT)
Dept: MAMMOGRAPHY | Facility: CLINIC | Age: 66
Discharge: HOME | End: 2021-08-12
Payer: COMMERCIAL

## 2021-08-12 VITALS — HEIGHT: 63 IN | BODY MASS INDEX: 35.08 KG/M2 | WEIGHT: 198 LBS

## 2021-08-12 DIAGNOSIS — Z12.31 ENCOUNTER FOR SCREENING MAMMOGRAM FOR MALIGNANT NEOPLASM OF BREAST: ICD-10-CM

## 2021-08-12 PROCEDURE — 77063 BREAST TOMOSYNTHESIS BI: CPT

## 2021-08-12 PROCEDURE — 77067 SCR MAMMO BI INCL CAD: CPT

## 2021-08-16 ENCOUNTER — TELEPHONE (OUTPATIENT)
Dept: OBGYN CLINIC | Facility: CLINIC | Age: 66
End: 2021-08-16

## 2021-08-16 DIAGNOSIS — R92.8 ABNORMAL FINDING ON MAMMOGRAPHY: Primary | ICD-10-CM

## 2021-08-16 NOTE — TELEPHONE ENCOUNTER
----- Message from Purnima Villanueva MD sent at 8/16/2021 12:22 PM EDT -----  Patient needs diagnostic mammogram and ultrasound at this time for the right breast     Thanks

## 2021-09-23 ENCOUNTER — HOSPITAL ENCOUNTER (OUTPATIENT)
Dept: MAMMOGRAPHY | Facility: CLINIC | Age: 66
Discharge: HOME | End: 2021-09-23
Payer: COMMERCIAL

## 2021-09-23 ENCOUNTER — HOSPITAL ENCOUNTER (OUTPATIENT)
Dept: ULTRASOUND IMAGING | Facility: CLINIC | Age: 66
Discharge: HOME | End: 2021-09-23
Payer: COMMERCIAL

## 2021-09-23 DIAGNOSIS — R92.8 ABNORMAL FINDING ON MAMMOGRAPHY: ICD-10-CM

## 2021-09-23 PROCEDURE — 77065 DX MAMMO INCL CAD UNI: CPT

## 2021-09-23 PROCEDURE — G0279 TOMOSYNTHESIS, MAMMO: HCPCS

## 2021-09-23 PROCEDURE — 76642 ULTRASOUND BREAST LIMITED: CPT

## 2022-06-15 ENCOUNTER — ANNUAL EXAM (OUTPATIENT)
Dept: OBGYN CLINIC | Facility: CLINIC | Age: 67
End: 2022-06-15
Payer: COMMERCIAL

## 2022-06-15 VITALS
HEIGHT: 63 IN | WEIGHT: 183 LBS | DIASTOLIC BLOOD PRESSURE: 84 MMHG | SYSTOLIC BLOOD PRESSURE: 182 MMHG | BODY MASS INDEX: 32.43 KG/M2

## 2022-06-15 DIAGNOSIS — N95.2 ATROPHIC VAGINITIS: ICD-10-CM

## 2022-06-15 DIAGNOSIS — N81.9 VAGINAL VAULT PROLAPSE: ICD-10-CM

## 2022-06-15 DIAGNOSIS — Z01.419 ENCOUNTER FOR GYNECOLOGICAL EXAMINATION WITHOUT ABNORMAL FINDING: ICD-10-CM

## 2022-06-15 DIAGNOSIS — Z12.31 ENCOUNTER FOR SCREENING MAMMOGRAM FOR MALIGNANT NEOPLASM OF BREAST: Primary | ICD-10-CM

## 2022-06-15 PROCEDURE — G0101 CA SCREEN;PELVIC/BREAST EXAM: HCPCS | Performed by: OBSTETRICS & GYNECOLOGY

## 2022-06-15 NOTE — PROGRESS NOTES
Assessment/Plan:    No problem-specific Assessment & Plan notes found for this encounter  Diagnoses and all orders for this visit:    Encounter for screening mammogram for malignant neoplasm of breast  -     Mammo screening bilateral w 3d & cad; Future    Encounter for gynecological examination without abnormal finding    Atrophic vaginitis    Vaginal vault prolapse          Normal gynecological physical examination  Self-breast examination stressed  Mammogram ordered  Discussed regular exercise, healthy diet, importance of vitamin D and calcium supplements  Discussed importance of sun block use during periods of prolonged sun exposure  Patient will be seen in 1 year for routine gynecologic and medical examination  Patient will call office for any problems, concerns, or issues which may arise during the interim  Subjective:          HPI    Patient ID: Izzy Soler is a 79 y o  female who presents today for her annual gynecologic and medical examination  She states that she is doing well overall and reports no specific concerns  Upon discussion, she noted some heat intolerance and increased sweating, moreso than usual  Recommended patient bring this concern to her PCP at her next visit  Patient is postmenopausal and reports some vaginal dryness  She denies any vasomotor symptoms, including hot flashes, night sweats, insomnia, and mood changes  She denies any episodes of vaginal bleeding or spotting  Patient reports normal appetite  She reports constipation, but has been using Colace and Senakot with relief of symptoms  She otherwise reports normal bowel and bladder habits  Denies any breast or nipple changes/abnormalities  She denies any fevers, chills, headaches, chest pain, SOB, N/V, abdominal/pelvic pain, or vaginal itching or discharge  Patient denies any COVID 19 symptoms including cough or loss of taste or smell  She is vaccinated with 3 doses       She has a PMH of HTN, anxiety, and a L-sided total hip replacement in February and reports that she feels great since the operation  She had a diagnostic mammogram and US R breast on 9/23/21, which was benign  She will be due for another screening mammogram in Aug 2022  Patient reports her last colonoscopy being 1-2 years ago, so she is up to date  The following portions of the patient's history were reviewed and updated as appropriate: allergies, current medications, past family history, past medical history, past social history, past surgical history and problem list     Review of Systems   Constitutional: Negative  Negative for appetite change, diaphoresis, fatigue, fever and unexpected weight change  HENT: Negative  Eyes: Negative  Respiratory: Negative  Negative for shortness of breath  Cardiovascular: Negative  Negative for chest pain  Followed for HTN   Gastrointestinal: Positive for constipation  Negative for abdominal pain, blood in stool, diarrhea, nausea and vomiting  Endocrine: Positive for heat intolerance  Negative for cold intolerance  Genitourinary: Negative  Negative for dysuria, frequency, hematuria, urgency, vaginal bleeding, vaginal discharge and vaginal pain  Positive for vaginal dryness   Musculoskeletal: Negative  Negative for arthralgias and back pain  Skin: Negative  Allergic/Immunologic: Negative  Neurological: Negative  Hematological: Negative  Negative for adenopathy  Psychiatric/Behavioral: Negative  Objective:      BP (!) 182/84 (BP Location: Left arm, Patient Position: Sitting, Cuff Size: Standard)   Ht 5' 3" (1 6 m)   Wt 83 kg (183 lb)   BMI 32 42 kg/m²          Physical Exam  Exam conducted with a chaperone present  Constitutional:       General: She is not in acute distress  Appearance: Normal appearance  She is well-developed  She is not diaphoretic  HENT:      Head: Normocephalic and atraumatic     Eyes:      Pupils: Pupils are equal, round, and reactive to light  Cardiovascular:      Rate and Rhythm: Normal rate and regular rhythm  Heart sounds: Normal heart sounds  No murmur heard  No friction rub  No gallop  Pulmonary:      Effort: Pulmonary effort is normal       Breath sounds: Normal breath sounds  Chest:   Breasts: Breasts are symmetrical       Right: No inverted nipple, mass, nipple discharge, skin change, tenderness or supraclavicular adenopathy  Left: No inverted nipple, mass, nipple discharge, skin change, tenderness or supraclavicular adenopathy  Comments: Fibrocystic breast changes noted bilaterally  Abdominal:      Palpations: Abdomen is soft  Genitourinary:     Exam position: Supine  Labia:         Right: No rash or lesion  Left: No rash or lesion  Vagina: Prolapsed vaginal walls present  No vaginal discharge, erythema, tenderness or bleeding  Cervix: No discharge or friability  Uterus: Not enlarged and not tender  Adnexa:         Right: No mass, tenderness or fullness  Left: No mass, tenderness or fullness  Rectum: Normal  Guaiac result negative  Comments: Minimal atrophic vaginitis present on physical exam  Mild vaginal vault prolapse noted  Musculoskeletal:         General: Normal range of motion  Cervical back: Normal range of motion and neck supple  Lymphadenopathy:      Cervical: No cervical adenopathy  Upper Body:      Right upper body: No supraclavicular adenopathy  Left upper body: No supraclavicular adenopathy  Skin:     General: Skin is warm and dry  Findings: No rash  Neurological:      General: No focal deficit present  Mental Status: She is alert and oriented to person, place, and time  Psychiatric:         Mood and Affect: Mood normal          Speech: Speech normal          Behavior: Behavior normal          Thought Content:  Thought content normal          Judgment: Judgment normal

## 2022-08-17 ENCOUNTER — HOSPITAL ENCOUNTER (OUTPATIENT)
Dept: MAMMOGRAPHY | Facility: CLINIC | Age: 67
Discharge: HOME | End: 2022-08-17
Payer: COMMERCIAL

## 2022-08-17 VITALS — WEIGHT: 175 LBS | HEIGHT: 63 IN | BODY MASS INDEX: 31.01 KG/M2

## 2022-08-17 DIAGNOSIS — Z12.31 ENCOUNTER FOR SCREENING MAMMOGRAM FOR MALIGNANT NEOPLASM OF BREAST: ICD-10-CM

## 2022-08-17 PROCEDURE — 77063 BREAST TOMOSYNTHESIS BI: CPT

## 2022-08-17 PROCEDURE — 77067 SCR MAMMO BI INCL CAD: CPT

## 2023-06-15 ENCOUNTER — ANNUAL EXAM (OUTPATIENT)
Dept: OBGYN CLINIC | Facility: CLINIC | Age: 68
End: 2023-06-15
Payer: COMMERCIAL

## 2023-06-15 VITALS
HEIGHT: 63 IN | BODY MASS INDEX: 34.52 KG/M2 | DIASTOLIC BLOOD PRESSURE: 80 MMHG | WEIGHT: 194.8 LBS | SYSTOLIC BLOOD PRESSURE: 148 MMHG

## 2023-06-15 DIAGNOSIS — Z01.419 ENCOUNTER FOR GYNECOLOGICAL EXAMINATION WITHOUT ABNORMAL FINDING: ICD-10-CM

## 2023-06-15 DIAGNOSIS — Z12.31 ENCOUNTER FOR SCREENING MAMMOGRAM FOR MALIGNANT NEOPLASM OF BREAST: Primary | ICD-10-CM

## 2023-06-15 DIAGNOSIS — N95.2 ATROPHIC VAGINITIS: ICD-10-CM

## 2023-06-15 PROCEDURE — G0101 CA SCREEN;PELVIC/BREAST EXAM: HCPCS | Performed by: OBSTETRICS & GYNECOLOGY

## 2023-06-15 RX ORDER — ALENDRONATE SODIUM 70 MG/1
70 TABLET ORAL WEEKLY
COMMUNITY
Start: 2023-03-17 | End: 2024-03-16

## 2023-06-15 RX ORDER — SENNOSIDES 8.6 MG
CAPSULE ORAL
COMMUNITY
Start: 2022-02-28

## 2023-06-15 RX ORDER — ALENDRONATE SODIUM 70 MG/1
TABLET ORAL
COMMUNITY
Start: 2023-06-12

## 2023-06-15 RX ORDER — DOCUSATE SODIUM 100 MG/1
CAPSULE, LIQUID FILLED ORAL
COMMUNITY
Start: 2022-02-28

## 2023-06-15 NOTE — PROGRESS NOTES
Assessment/Plan:    No problem-specific Assessment & Plan notes found for this encounter  Diagnoses and all orders for this visit:    Encounter for screening mammogram for malignant neoplasm of breast  -     Mammo screening bilateral w 3d & cad; Future    Encounter for gynecological examination without abnormal finding    Atrophic vaginitis    Other orders  -     alendronate (FOSAMAX) 70 mg tablet; Take 70 mg by mouth Once a week  -     alendronate (FOSAMAX) 70 mg tablet;  (Patient not taking: Reported on 6/15/2023)  -     docusate sodium (Colace) 100 mg capsule  -     Sennosides (Senna) 8 6 MG CAPS          Normal gynecological physical examination  Self-breast examination stressed  Mammogram ordered  Discussed regular exercise, healthy diet, importance of vitamin D and calcium supplements  Discussed importance of sun block use during periods of prolonged sun exposure  Patient will be seen in 1 year for routine gynecologic and medical examination  Patient will call office for any problems, concerns, or issues which may arise during the interim  Subjective: Marita Dang is a 31-year-old female with paroxysmal SVT and fibromyalgia presenting for her yearly appointment  She states she does not have any concerns today nothing is changed since last visit  She does breast exams at home and denies noticing any masses, areas of tenderness, discoloration, or nipple changes  She is faithful getting a mammogram each year, and her last mammogram was completely benign  Her sister-in-law was recently diagnosed with breast cancer and underwent a mastectomy and reconstruction surgery, so she is a little more anxious about [facility] breast cancer  Order for repeat mammogram will be placed today  She also denies any vaginal bleeding, vaginal discharge, urinary symptoms, chest pain, or shortness of breath    Her only other question is that within the last 2 months she has noticed tingling in her right arm that comes and goes  She says it is from the elbow region to her fingers, but she also has noted some pain in her neck  She does have fibromyalgia so she is not sure if this is just her normal pain or something new  Patient instructed to follow-up with PCP for further work-up/referrals needed  Follow-up in 1 year for yearly appointment  Patient ID: Jose Armando Su is a 76 y o  female who presents today for her annual gynecologic and medical examination    Menstrual status: Postmenopausal    Vasomotor symptoms: Denies symptoms    Patient reports normal appetite    Patient reports normal bowel and bladder habits    Patient denies any significant pelvic or abdominal pain    Patient denies any headaches, chest pain, shortness of breath fever shakes or chills    Patient denies any COVID 19 symptoms including cough or loss of taste or smell    COVID vaccine status: Fully vaccinated    Medical problems: Followed for paroxysmal SVT and fibromyalgia    Colonoscopy status: Up-to-date follow-up with PCP    Mammogram status: Up-to-date last mammogram was completely benign  The following portions of the patient's history were reviewed and updated as appropriate: allergies, current medications, past family history, past medical history, past social history, past surgical history and problem list     Review of Systems   Constitutional: Negative  Negative for appetite change, diaphoresis, fatigue, fever and unexpected weight change  HENT: Negative  Eyes: Negative  Respiratory: Negative  Cardiovascular: Negative  Gastrointestinal: Negative  Negative for abdominal pain, blood in stool, constipation, diarrhea, nausea and vomiting  Endocrine: Negative  Negative for cold intolerance and heat intolerance  Genitourinary: Negative  Negative for dysuria, frequency, hematuria, urgency, vaginal bleeding, vaginal discharge and vaginal pain  Musculoskeletal: Positive for neck stiffness  Skin: Negative  "  Allergic/Immunologic: Negative  Neurological: Positive for numbness (periodic tingling in right elbow to fingers)  Hematological: Negative  Negative for adenopathy  Psychiatric/Behavioral: Negative  Objective:      /80   Ht 5' 3\" (1 6 m)   Wt 88 4 kg (194 lb 12 8 oz)   BMI 34 51 kg/m²          Physical Exam  Constitutional:       Appearance: Normal appearance  She is well-developed  HENT:      Head: Normocephalic  Eyes:      Pupils: Pupils are equal, round, and reactive to light  Cardiovascular:      Rate and Rhythm: Normal rate and regular rhythm  Comments: Followed for paroxysmal SVT  Pulmonary:      Effort: Pulmonary effort is normal       Breath sounds: Normal breath sounds  Chest:   Breasts:     Breasts are symmetrical       Right: No inverted nipple, mass, nipple discharge, skin change or tenderness  Left: No inverted nipple, mass, nipple discharge, skin change or tenderness  Abdominal:      General: Bowel sounds are normal       Palpations: Abdomen is soft  Genitourinary:     General: Normal vulva  Exam position: Supine  Labia:         Right: No rash, tenderness, lesion or injury  Left: No rash, tenderness, lesion or injury  Vagina: Normal  No erythema, tenderness or bleeding  Cervix: No discharge or friability  Uterus: Not enlarged, not fixed and not tender  Adnexa:         Right: No mass, tenderness or fullness  Left: No mass, tenderness or fullness  Rectum: Normal  Guaiac result negative  Comments: Minimal atrophic vaginal changes visualized on exam   Pelvic muscles show good support and are intact  Musculoskeletal:         General: Normal range of motion  Cervical back: Normal range of motion and neck supple  Comments: Follow-up for fibromyalgia   Lymphadenopathy:      Cervical: No cervical adenopathy  Upper Body:      Right upper body: No supraclavicular adenopathy        Left " upper body: No supraclavicular adenopathy  Skin:     General: Skin is warm and dry  Findings: No rash  Neurological:      General: No focal deficit present  Mental Status: She is alert and oriented to person, place, and time  Psychiatric:         Mood and Affect: Mood normal          Speech: Speech normal          Behavior: Behavior normal          Thought Content:  Thought content normal          Judgment: Judgment normal

## 2023-06-15 NOTE — PATIENT INSTRUCTIONS
Normal gynecological physical examination  Self-breast examination stressed  Mammogram ordered  Last mammogram was completely benign  Patients new onset right arm tingling should be followed up with PCP for further work-up/referrals  Discussed regular exercise, healthy diet, importance of vitamin D and calcium supplements  Discussed importance of sun block use during periods of prolonged sun exposure  All patient's questions answered in office today  Patient will be seen in 1 year for routine gynecologic and medical examination  Patient will call office for any problems, concerns, or issues which may arise during the interim  Hide Include Location In Plan Question?: No Detail Level: Zone Detail Level: Detailed

## 2023-08-25 ENCOUNTER — HOSPITAL ENCOUNTER (OUTPATIENT)
Dept: MAMMOGRAPHY | Facility: CLINIC | Age: 68
Discharge: HOME | End: 2023-08-25
Payer: COMMERCIAL

## 2023-08-25 VITALS — HEIGHT: 63 IN | WEIGHT: 194.89 LBS | BODY MASS INDEX: 34.53 KG/M2

## 2023-08-25 DIAGNOSIS — Z12.31 ENCOUNTER FOR SCREENING MAMMOGRAM FOR MALIGNANT NEOPLASM OF BREAST: ICD-10-CM

## 2023-08-25 PROCEDURE — 77063 BREAST TOMOSYNTHESIS BI: CPT

## 2023-08-25 PROCEDURE — 77067 SCR MAMMO BI INCL CAD: CPT

## 2024-06-18 ENCOUNTER — ANNUAL EXAM (OUTPATIENT)
Dept: OBGYN CLINIC | Facility: CLINIC | Age: 69
End: 2024-06-18
Payer: COMMERCIAL

## 2024-06-18 VITALS
WEIGHT: 188.8 LBS | BODY MASS INDEX: 33.45 KG/M2 | HEIGHT: 63 IN | SYSTOLIC BLOOD PRESSURE: 122 MMHG | DIASTOLIC BLOOD PRESSURE: 80 MMHG

## 2024-06-18 DIAGNOSIS — N95.2 ATROPHIC VAGINITIS: ICD-10-CM

## 2024-06-18 DIAGNOSIS — Z12.31 ENCOUNTER FOR SCREENING MAMMOGRAM FOR MALIGNANT NEOPLASM OF BREAST: ICD-10-CM

## 2024-06-18 DIAGNOSIS — Z01.419 ENCOUNTER FOR GYNECOLOGICAL EXAMINATION WITHOUT ABNORMAL FINDING: Primary | ICD-10-CM

## 2024-06-18 PROCEDURE — G0101 CA SCREEN;PELVIC/BREAST EXAM: HCPCS | Performed by: OBSTETRICS & GYNECOLOGY

## 2024-06-18 RX ORDER — SELENIUM SULFIDE 2.5 MG/100ML
LOTION TOPICAL DAILY PRN
COMMUNITY
Start: 2024-01-17

## 2024-06-18 RX ORDER — NAPROXEN 500 MG/1
TABLET ORAL
COMMUNITY

## 2024-06-18 RX ORDER — METHOCARBAMOL 500 MG/1
TABLET, FILM COATED ORAL
COMMUNITY

## 2024-06-18 RX ORDER — OXYBUTYNIN CHLORIDE 5 MG/1
TABLET ORAL
COMMUNITY
Start: 2023-07-10

## 2024-06-18 NOTE — PROGRESS NOTES
Assessment/Plan:    No problem-specific Assessment & Plan notes found for this encounter.       Diagnoses and all orders for this visit:    Encounter for gynecological examination without abnormal finding    Encounter for screening mammogram for malignant neoplasm of breast    Atrophic vaginitis    Other orders  -     diclofenac sodium (VOLTAREN) 50 mg EC tablet; TAKE 1 TABLET TWICE A DAY BY ORAL ROUTE AS NEEDED. (Patient not taking: Reported on 6/18/2024)  -     methocarbamol (ROBAXIN) 500 mg tablet; TAKE 1 TABLET BY MOUTH NIGHTLY AS NEEDED FOR MUSCLE SPASMS. (Patient not taking: Reported on 6/18/2024)  -     naproxen (NAPROSYN) 500 mg tablet  -     oxybutynin (DITROPAN) 5 mg tablet; TAKE 1 TABLET (5 MG TOTAL) BY MOUTH 2 (TWO) TIMES A DAY AS NEEDED (EXCESSIVE PERSPIRATION). (Patient not taking: Reported on 6/18/2024)  -     selenium sulfide (SELSUN) 2.5 % shampoo; Apply topically daily as needed (Patient not taking: Reported on 6/18/2024)        Normal gynecological physical examination.  Self-breast examination stressed.  Mammogram ordered.  Discussed regular exercise, healthy diet, importance of vitamin D and calcium supplements.  Discussed importance of sun block use during periods of prolonged sun exposure.  Patient will be seen in 1 year for routine gynecologic and medical examination.  Patient will call office for any problems, concerns, or issues which may arise during the interim.       Subjective:      HPI    Patient ID: Karma Friedman is a 69 y.o. female who presents today for her annual gynecologic and medical examination. No complaints at this time. She denies any abnormal vaginal discharge, bleeding, irritation, dryness, or general discomfort. Patient denies any changes to the breasts including any pain, tenderness, masses, or nipple discharge. Sexually active. No concerns.     Menstrual status: s/p hysterectomy    Vasomotor symptoms: denies at this time    Patient reports normal appetite    Patient  "reports normal bowel and bladder habits    Patient denies any significant pelvic or abdominal pain    Patient denies any headaches, chest pain, shortness of breath fever shakes or chills    Patient denies any COVID 19 symptoms including cough or loss of taste or smell    COVID vaccine status: patient understands covid vaccination protocol    Medical problems:HTN controlled    Colonoscopy status:up-to-date, completed 3 years ago, 10 year f/u    Mammogram status:due in 8/2024    The following portions of the patient's history were reviewed and updated as appropriate: allergies, current medications, past family history, past medical history, past social history, past surgical history and problem list.    Review of Systems   Constitutional: Negative.  Negative for appetite change, diaphoresis, fatigue, fever and unexpected weight change.   HENT: Negative.     Eyes: Negative.    Respiratory: Negative.     Cardiovascular: Negative.    Gastrointestinal: Negative.  Negative for abdominal pain, blood in stool, constipation, diarrhea, nausea and vomiting.   Endocrine: Negative.  Negative for cold intolerance and heat intolerance.   Genitourinary: Negative.  Negative for dysuria, frequency, hematuria, urgency, vaginal bleeding, vaginal discharge and vaginal pain.   Musculoskeletal: Negative.    Skin: Negative.    Allergic/Immunologic: Negative.    Neurological: Negative.    Hematological: Negative.  Negative for adenopathy.   Psychiatric/Behavioral: Negative.           Objective:    /80   Ht 5' 3\" (1.6 m)   Wt 85.6 kg (188 lb 12.8 oz)   BMI 33.44 kg/m²      Physical Exam  Constitutional:       General: She is not in acute distress.     Appearance: Normal appearance. She is well-developed. She is not diaphoretic.   HENT:      Head: Normocephalic and atraumatic.   Eyes:      Pupils: Pupils are equal, round, and reactive to light.   Cardiovascular:      Rate and Rhythm: Normal rate and regular rhythm.      Heart sounds: " Normal heart sounds. No murmur heard.     No friction rub. No gallop.   Pulmonary:      Effort: Pulmonary effort is normal.      Breath sounds: Normal breath sounds.   Chest:   Breasts:     Breasts are symmetrical.      Right: No inverted nipple, mass, nipple discharge, skin change or tenderness.      Left: No inverted nipple, mass, nipple discharge, skin change or tenderness.   Abdominal:      General: Bowel sounds are normal.      Palpations: Abdomen is soft.   Genitourinary:     General: Normal vulva.      Exam position: Supine.      Labia:         Right: No rash or lesion.         Left: No rash or lesion.       Urethra: No urethral swelling or urethral lesion.      Vagina: Normal. No vaginal discharge, erythema, tenderness or bleeding.      Cervix: No discharge or friability.      Uterus: Absent.       Adnexa:         Right: No mass, tenderness or fullness.          Left: No mass, tenderness or fullness.        Rectum: Normal. Guaiac result negative.      Comments: Pelvic exam revealed mild atrophic vaginitis  Good pelvic support confirmed  Musculoskeletal:         General: Normal range of motion.      Cervical back: Normal range of motion and neck supple.   Lymphadenopathy:      Cervical: No cervical adenopathy.      Upper Body:      Right upper body: No supraclavicular adenopathy.      Left upper body: No supraclavicular adenopathy.   Skin:     General: Skin is warm and dry.      Findings: No rash.   Neurological:      General: No focal deficit present.      Mental Status: She is alert and oriented to person, place, and time.   Psychiatric:         Mood and Affect: Mood normal.         Speech: Speech normal.         Behavior: Behavior normal.         Thought Content: Thought content normal.         Judgment: Judgment normal.

## 2024-08-26 ENCOUNTER — HOSPITAL ENCOUNTER (OUTPATIENT)
Dept: MAMMOGRAPHY | Facility: CLINIC | Age: 69
Discharge: HOME | End: 2024-08-26
Payer: COMMERCIAL

## 2024-08-26 VITALS — HEIGHT: 63 IN | BODY MASS INDEX: 33.31 KG/M2 | WEIGHT: 188 LBS

## 2024-08-26 DIAGNOSIS — Z12.31 ENCOUNTER FOR SCREENING MAMMOGRAM FOR MALIGNANT NEOPLASM OF BREAST: ICD-10-CM

## 2024-08-26 PROCEDURE — 77067 SCR MAMMO BI INCL CAD: CPT

## 2024-08-26 PROCEDURE — 77063 BREAST TOMOSYNTHESIS BI: CPT

## 2025-06-20 ENCOUNTER — ANNUAL EXAM (OUTPATIENT)
Dept: OBGYN CLINIC | Facility: CLINIC | Age: 70
End: 2025-06-20
Payer: COMMERCIAL

## 2025-06-20 VITALS — SYSTOLIC BLOOD PRESSURE: 120 MMHG | BODY MASS INDEX: 33.87 KG/M2 | WEIGHT: 191.2 LBS | DIASTOLIC BLOOD PRESSURE: 80 MMHG

## 2025-06-20 DIAGNOSIS — Z01.419 ENCOUNTER FOR ANNUAL ROUTINE GYNECOLOGICAL EXAMINATION: ICD-10-CM

## 2025-06-20 DIAGNOSIS — Z12.31 ENCOUNTER FOR SCREENING MAMMOGRAM FOR MALIGNANT NEOPLASM OF BREAST: Primary | ICD-10-CM

## 2025-06-20 DIAGNOSIS — N81.89 PELVIC FLOOR RELAXATION: ICD-10-CM

## 2025-06-20 PROCEDURE — G0101 CA SCREEN;PELVIC/BREAST EXAM: HCPCS | Performed by: OBSTETRICS & GYNECOLOGY

## 2025-06-20 RX ORDER — CLOPIDOGREL BISULFATE 75 MG/1
75 TABLET ORAL DAILY
COMMUNITY
Start: 2024-12-15

## 2025-06-20 NOTE — PROGRESS NOTES
Assessment/Plan:    No problem-specific Assessment & Plan notes found for this encounter.       Diagnoses and all orders for this visit:    Encounter for screening mammogram for malignant neoplasm of breast  -     Mammo screening bilateral w 3d and cad; Future    Encounter for annual routine gynecological examination    Pelvic floor relaxation    Other orders  -     clopidogrel (PLAVIX) 75 mg tablet; Take 75 mg by mouth daily          Normal gynecological physical examination.  Self-breast examination stressed.  Mammogram ordered.  Discussed regular exercise, healthy diet, importance of vitamin D and calcium supplements.  Discussed importance of sun block use during periods of prolonged sun exposure.  Patient will be seen in 1 year for routine gynecologic and medical examination.  Patient will call office for any problems, concerns, or issues which may arise during the interim.     Subjective:      Karma Friedman is a 69 yo F with PMH sig for CVA in 12/2024, internal heart monitor placed in 03/2025, HTN, osteoarthritis  presenting to the office for her yearly exam. Pt reports that she feels well and has no current complaints. UTD on mammograms and colonoscopies. BP, blood sugar, and cholesterol are all well controlled and she regularly sees her PCP.     Pt denies vaginal bleeding, discharge, abdominal pain, pelvic pain, diarrhea, constipation, dysuria, hematuria, chest pain, sob, fevers, chills, unintentional weight loss/gain, breast tenderness, masses, insomnia, irritability, hot flashes, vaginal dryness.     Informed pt that her physical exam revealed no abnormalities.     Follow up in one year for next annual exam.     Call office with any questions, comments, or concerns in the interim.       Gynecologic Exam  She reports no pelvic pain or vaginal discharge. Pertinent negatives include no chills, constipation, diarrhea, dysuria, fever, flank pain, frequency, hematuria, nausea, urgency or vomiting.       Patient  ID: Karma Friedman is a 70 y.o. female who presents today for her annual gynecologic and medical examination    Menstrual status: Postmenopausal    Vasomotor symptoms: None    Patient reports normal appetite    Patient reports normal bowel and bladder habits    Patient denies any significant pelvic or abdominal pain    Patient denies any headaches, chest pain, shortness of breath fever shakes or chills    Patient denies any COVID 19 symptoms including cough or loss of taste or smell    COVID vaccine status: Patient aware of COVID vaccination protocols.     Medical problems: CVA in 12/2024, internal heart monitor placed in 03/2025, HTN, osteoarthritis      Colonoscopy status: UTD on colonoscopies per patient. She states that she thinks the procedure took place outside of the network.    Mammogram status: Patient does regular self breast exams and is up-to-date with screening mammography. Appropriate arrangements for her annual screening mammogram are placed into the EMR system at this visit.      The following portions of the patient's history were reviewed and updated as appropriate: allergies, current medications, past family history, past medical history, past social history, past surgical history and problem list.    Review of Systems   Constitutional: Negative.  Negative for appetite change, chills, diaphoresis, fatigue, fever and unexpected weight change.   HENT: Negative.     Eyes: Negative.    Respiratory: Negative.  Negative for shortness of breath.    Cardiovascular: Negative.  Negative for chest pain, palpitations and leg swelling.        S/p loop recorder placement in 03/2025  Followed for HTN   Gastrointestinal: Negative.  Negative for blood in stool, constipation, diarrhea, nausea and vomiting.   Endocrine: Negative.  Negative for cold intolerance and heat intolerance.   Genitourinary: Negative.  Negative for decreased urine volume, difficulty urinating, dyspareunia, dysuria, flank pain, frequency,  genital sores, hematuria, pelvic pain, urgency, vaginal bleeding, vaginal discharge and vaginal pain.   Musculoskeletal: Negative.         Followed for osteoarthritis   Skin: Negative.    Allergic/Immunologic: Negative.    Neurological: Negative.         H/o CVA 12/2024   All other systems reviewed and are negative.        Objective:      /80   Wt 86.7 kg (191 lb 3.2 oz)   BMI 33.87 kg/m²          Physical Exam  Vitals reviewed. Exam conducted with a chaperone present.   Constitutional:       General: She is not in acute distress.     Appearance: Normal appearance.   HENT:      Head: Normocephalic and atraumatic.     Eyes:      Pupils: Pupils are equal, round, and reactive to light.       Cardiovascular:      Rate and Rhythm: Normal rate and regular rhythm.      Heart sounds: No murmur heard.  Pulmonary:      Effort: Pulmonary effort is normal. No respiratory distress.      Breath sounds: Normal breath sounds.   Chest:   Breasts:     Breasts are symmetrical.      Right: Normal. No inverted nipple, mass, nipple discharge or tenderness.      Left: Normal. No inverted nipple, mass, nipple discharge or tenderness.   Abdominal:      Palpations: Abdomen is soft.   Genitourinary:     General: Normal vulva.      Exam position: Supine.      Labia:         Right: No rash or lesion.         Left: No rash or lesion.       Vagina: Normal. No vaginal discharge, erythema, tenderness or lesions.      Cervix: Normal. No discharge, friability or lesion.      Uterus: Normal. Not enlarged and not tender.       Adnexa: Right adnexa normal and left adnexa normal.        Right: No mass, tenderness or fullness.          Left: No mass, tenderness or fullness.        Rectum: Normal. Guaiac result negative.      Comments: Mild vaginal atrophy noted on exam. Mild prolapse of pelvic tissues.    Musculoskeletal:         General: No swelling. Normal range of motion.      Cervical back: Neck supple.   Lymphadenopathy:      Cervical: No  cervical adenopathy.      Upper Body:      Right upper body: No supraclavicular adenopathy.      Left upper body: No supraclavicular adenopathy.     Skin:     General: Skin is warm and dry.     Neurological:      General: No focal deficit present.     Psychiatric:         Mood and Affect: Mood normal.         Behavior: Behavior normal.         Thought Content: Thought content normal.         Judgment: Judgment normal.